# Patient Record
Sex: FEMALE | Race: WHITE | NOT HISPANIC OR LATINO | Employment: UNEMPLOYED | URBAN - METROPOLITAN AREA
[De-identification: names, ages, dates, MRNs, and addresses within clinical notes are randomized per-mention and may not be internally consistent; named-entity substitution may affect disease eponyms.]

---

## 2018-05-02 ENCOUNTER — HOSPITAL ENCOUNTER (EMERGENCY)
Facility: HOSPITAL | Age: 43
Discharge: HOME | End: 2018-05-02
Attending: EMERGENCY MEDICINE
Payer: COMMERCIAL

## 2018-05-02 VITALS
OXYGEN SATURATION: 98 % | HEART RATE: 95 BPM | TEMPERATURE: 99 F | SYSTOLIC BLOOD PRESSURE: 190 MMHG | RESPIRATION RATE: 16 BRPM | WEIGHT: 173 LBS | DIASTOLIC BLOOD PRESSURE: 103 MMHG

## 2018-05-02 DIAGNOSIS — M25.561 ACUTE PAIN OF RIGHT KNEE: Primary | ICD-10-CM

## 2018-05-02 PROCEDURE — 99283 EMERGENCY DEPT VISIT LOW MDM: CPT

## 2018-05-02 PROCEDURE — 99283 EMERGENCY DEPT VISIT LOW MDM: CPT | Mod: ,,, | Performed by: PHYSICIAN ASSISTANT

## 2018-05-02 NOTE — ED PROVIDER NOTES
Encounter Date: 5/2/2018       History     Chief Complaint   Patient presents with    Knee Pain     jumped off back of a truck and c/o right knee pain and swelling      Patient is a 42 year old female with no significant PMHx. She presents to the ED for knee pain. She reports having right knee pain onset today at noon. Reports jumping out the back of the truck and right knee buckling down to ground. Describes right knee pain as constant, throbbing, and tightness. Reports associated swelling. Rates pain 8/10. Reports pain with weight bearing. Reports pain relief with Excedrin taken at 12:45 PM. She denies fever,chills, nausea, vomiting, sob, chest pain, abd pain, dysuria, diarrhea, or constipation. She is a non smoker and denies alcohol use.             Review of patient's allergies indicates:  Allergies not on file  History reviewed. No pertinent past medical history.  Past Surgical History:   Procedure Laterality Date    CHOLECYSTECTOMY      TONSILLECTOMY       History reviewed. No pertinent family history.  Social History   Substance Use Topics    Smoking status: Never Smoker    Smokeless tobacco: Never Used    Alcohol use No     Review of Systems   Constitutional: Negative for fever.   HENT: Negative for sore throat.    Respiratory: Negative for shortness of breath.    Cardiovascular: Negative for chest pain.   Gastrointestinal: Negative for abdominal pain, nausea and vomiting.   Genitourinary: Negative for dysuria.   Musculoskeletal: Positive for arthralgias (right knee pain). Negative for back pain.   Skin: Negative for rash.   Neurological: Negative for weakness.   Hematological: Does not bruise/bleed easily.       Physical Exam     Initial Vitals [05/02/18 1344]   BP Pulse Resp Temp SpO2   (!) 190/103 95 16 98.5 °F (36.9 °C) 98 %      MAP       132         Physical Exam    Vitals reviewed.  Constitutional: She appears well-developed and well-nourished. She is not diaphoretic. No distress.   Patient  resting comfortably in NAD on RA.    HENT:   Head: Normocephalic and atraumatic.   Nose: Nose normal.   Eyes: Conjunctivae and EOM are normal. Pupils are equal, round, and reactive to light.   Neck: Normal range of motion.   Cardiovascular: Normal rate and regular rhythm. Exam reveals no friction rub.    No murmur heard.  Pulmonary/Chest: Breath sounds normal. No respiratory distress. She has no wheezes. She has no rales.   Abdominal: Soft. Bowel sounds are normal. She exhibits no distension. There is no tenderness. There is no rebound.   Musculoskeletal:        Right knee: She exhibits decreased range of motion (pain with extension) and swelling. She exhibits no deformity and no erythema.   Neurological: She is alert and oriented to person, place, and time. She has normal strength. No sensory deficit.   Skin: Skin is warm and dry. No erythema.   Psychiatric: She has a normal mood and affect. Thought content normal.         ED Course   Procedures  Labs Reviewed - No data to display     Imaging Results          X-Ray Knee 3 View Right (Final result)  Result time 05/02/18 16:11:22    Final result by James Healy MD (05/02/18 16:11:22)                 Impression:      There is no evidence of fracture or subluxation.      Electronically signed by: James Healy MD  Date:    05/02/2018  Time:    16:11             Narrative:    EXAMINATION:  XR KNEE 3 VIEW RIGHT    CLINICAL HISTORY:  Pain in right knee    TECHNIQUE:  AP, lateral, and Merchant views of the right knee were performed.    COMPARISON:  None    FINDINGS:  The alignment is within normal limits.There is no evidence for displaced fracture.No evidence of lytic or blastic lesions.Soft tissues are unremarkable.Joint spaces are unremarkable.                                         APC / Resident Notes:   Patient is a 42 year old female with no significant PMHx. She presents to the ED for knee pain.    Will order imaging. Pain medication offered to patient,  patient declines at this time reports pain is tolerable. Will continue to monitor.     Differential diagnoses include, but are not limited to: contusion, knee effusion, fracture, dislocation, gout, and osteoarthritis.     Imaging found to have no evidence of fracture or subluxation.    Patient reassessed, reports symptoms improved with ED management. I have discussed emergency department findings, and plan with the patient. Will discharge home with F/U with ortho and PCP. Patient verbalizes understanding of plan and agrees. Return precautions given.     I have discussed and reviewed with my supervising physician.             Attending Attestation:     Physician Attestation Statement for NP/PA:   I discussed this assessment and plan of this patient with the NP/PA, but I did not personally examine the patient. The face to face encounter was performed by the NP/PA.            Clinical Impression:   The encounter diagnosis was Acute pain of right knee.    Disposition:   Disposition: Discharged  Condition: Stable                        Bea Guzman PA-C  05/02/18 1812       Jose L Cortez MD  05/07/18 7304

## 2018-05-02 NOTE — ED NOTES
LOC: The patient is awake, alert, and oriented to place, time, situation. Affect is appropriate.  Speech is appropriate and clear.     APPEARANCE: Patient resting comfortably in no acute distress.  Patient is clean and well groomed.    SKIN: The skin is warm and dry; color consistent with ethnicity.  Patient has normal skin turgor and moist mucus membranes.  Skin intact; no breakdown or bruising noted.     MUSCULOSKELETAL: Patient moving upper extremities without difficulty.  C/o pain right knee.  Swelling noted. Denies weakness.     RESPIRATORY: Airway is open and patent. Respirations spontaneous, even, easy, and non-labored.  Patient has a normal effort and rate.  No accessory muscle use noted. Denies cough.     CARDIAC:   No peripheral edema noted. No complaints of chest pain.      ABDOMEN: Soft and non tender to palpation.  No distention noted.     NEUROLOGIC: Eyes open spontaneously.  Behavior appropriate to situation.  Follows commands; facial expression symmetrical.  Purposeful motor response noted; normal sensation in all extremities.

## 2018-05-02 NOTE — ED TRIAGE NOTES
Come to the ED with c/o right knee pain after jumping out of the bed of a  truck. Pt felt pain behind the knee.

## 2018-05-07 ENCOUNTER — OFFICE VISIT (OUTPATIENT)
Dept: ORTHOPEDICS | Facility: CLINIC | Age: 43
End: 2018-05-07
Payer: COMMERCIAL

## 2018-05-07 VITALS
WEIGHT: 171.19 LBS | HEIGHT: 63 IN | BODY MASS INDEX: 30.33 KG/M2 | DIASTOLIC BLOOD PRESSURE: 80 MMHG | SYSTOLIC BLOOD PRESSURE: 130 MMHG | HEART RATE: 92 BPM

## 2018-05-07 DIAGNOSIS — M25.561 ACUTE PAIN OF RIGHT KNEE: Primary | ICD-10-CM

## 2018-05-07 DIAGNOSIS — M25.361 INSTABILITY OF RIGHT KNEE JOINT: ICD-10-CM

## 2018-05-07 PROCEDURE — 3008F BODY MASS INDEX DOCD: CPT | Mod: CPTII,S$GLB,, | Performed by: PHYSICIAN ASSISTANT

## 2018-05-07 PROCEDURE — 99203 OFFICE O/P NEW LOW 30 MIN: CPT | Mod: S$GLB,,, | Performed by: PHYSICIAN ASSISTANT

## 2018-05-07 PROCEDURE — 99999 PR PBB SHADOW E&M-EST. PATIENT-LVL III: CPT | Mod: PBBFAC,,, | Performed by: PHYSICIAN ASSISTANT

## 2018-05-07 RX ORDER — HYDROCODONE BITARTRATE AND ACETAMINOPHEN 5; 325 MG/1; MG/1
1-2 TABLET ORAL EVERY 6 HOURS PRN
Qty: 30 TABLET | Refills: 0 | Status: SHIPPED | OUTPATIENT
Start: 2018-05-07

## 2018-05-07 NOTE — PROGRESS NOTES
SUBJECTIVE:     Chief Complaint & History of Present Illness:  Samantha Aranda is a New patient 42 y.o. female who is seen here today with a complaint of    Chief Complaint   Patient presents with    Right Knee - Pain, Swelling, Injury    .  Should today for evaluation treatment of sudden onset pain and swelling in the right knee following a fall while jumping from the back of a vehicle.  She's had collapse of the right knee falling to ground developed immediate pain and inability to bear weight.  She was seen in urgent care 5/02/2018 x-rays at that time demonstrated no evidence of fracture dislocation.  She has a been nonweightbearing crutch walking since the time of the injury but continues to struggle with persistent swelling and effusion in the knee and ability to flex greater than 25° and weight-bear P James reports the pain is predominant in the posterior aspect of the knee  On a scale of 1-10, with 10 being worst pain imaginable, he rates this pain as 7 on good days and 10 on bad days.  she describes the pain as tender and sore.    Review of patient's allergies indicates:  No Known Allergies      Current Outpatient Prescriptions   Medication Sig Dispense Refill    aspirin-acetaminophen-caffeine 250-250-65 mg (EXCEDRIN MIGRAINE) 250-250-65 mg per tablet Take 1 tablet by mouth every 6 (six) hours as needed for Pain.      hydrocodone-acetaminophen 5-325mg (NORCO) 5-325 mg per tablet Take 1-2 tablets by mouth every 6 (six) hours as needed for Pain. 30 tablet 0     No current facility-administered medications for this visit.        History reviewed. No pertinent past medical history.    Past Surgical History:   Procedure Laterality Date    CHOLECYSTECTOMY      TONSILLECTOMY         Vital Signs (Most Recent)  Vitals:    05/07/18 1604   BP: 130/80   Pulse: 92           Review of Systems:  ROS:  Constitutional: no fever or chills  Eyes: no visual changes  ENT: no nasal congestion or sore throat  Respiratory:  "no cough or shortness of breath  Cardiovascular: no chest pain or palpitations  Gastrointestinal: no nausea or vomiting, tolerating diet  Genitourinary: no hematuria or dysuria  Integument/Breast: no rash or pruritis  Hematologic/Lymphatic: no easy bruising or lymphadenopathy  Musculoskeletal: no arthralgias or myalgias  Neurological: no seizures or tremors  Behavioral/Psych: no auditory or visual hallucinations  Endocrine: no heat or cold intolerance                OBJECTIVE:     PHYSICAL EXAM:  Height: 5' 3" (160 cm) Weight: 77.7 kg (171 lb 3 oz), General Appearance: Well nourished, well developed, in no acute distress.  Neurological: Mood & affect are normal.  right  Knee Exam:  Knee Range of Motion:5-30 degrees flexion   Effusion:yes  Condition of skin:intact  Location of tenderness: Globally and popliteal fossa   Strength:limited by pain  Stability:  Lachman: unstable, LCL: stable, MCL: stable, PCL: grade I and posteromedial (dial): unstable  Varus /Valgus stress:  normal  Nell:   Unequivocal    left  Knee Exam:  Knee Range of Motion:0-125 degrees flexion   Effusion:none  Condition of skin:intact  Location of tenderness:None   Strength:5 of 5  Stability:  stable to testing  Varus /Valgus stress:  normal      Hip Examination:  normal    RADIOGRAPHS:  X-rays from previous visit reviewed and me today demonstrate well preserved joint spaces throughout the knee no evidence of fracture dislocation or other bony abnormality    ASSESSMENT/PLAN:     Plan: We discussed with the patient at length all the different treatment options available for  the knee including anti-inflammatories, acetaminophen, rest, ice, knee strengthening exercise, occasional cortisone injections for temporary relief, Viscosupplimentation injections, arthroscopic debridement osteotomy, and finally knee arthroplasty.   Patient remain nonweightbearing crutch walking  Hinged knee brace  Rest ice elevation  Norco 5/325 every 4-6 hours when " necessary for pain control  MRI of the right knee  Follow-up after MRI to discuss treatment plans

## 2018-05-07 NOTE — LETTER
May 7, 2018      Jose L Cortez MD  [address] [facility] [place] [address]           [facility]  [address] [address]  Phone: [phone]  Fax: [phone]          Patient: Samantha Aranda   MR Number: [id number]   YOB: 1975   Date of Visit: 5/7/2018       Dear Dr. Jose L Cortez:    Thank you for referring Samantha Aranda to me for evaluation. Attached you will find relevant portions of my assessment and plan of care.    If you have questions, please do not hesitate to call me. I look forward to following Samantha Aranda along with you.    Sincerely,    Levi Miller PA-C    Enclosure  CC:  No Recipients    If you would like to receive this communication electronically, please contact [email or web address] or [phone] to request more information on [employer] Link access.    For providers and/or their staff who would like to refer a patient to [facility], please contact us through our one-stop-shop provider referral line, [facility] , at [phone].    If you feel you have received this communication in error or would no longer like to receive these types of communications, please e-mail [email or web address]

## 2018-05-08 ENCOUNTER — HOSPITAL ENCOUNTER (OUTPATIENT)
Dept: RADIOLOGY | Facility: HOSPITAL | Age: 43
Discharge: HOME | End: 2018-05-08
Attending: PHYSICIAN ASSISTANT
Payer: COMMERCIAL

## 2018-05-08 DIAGNOSIS — M25.361 INSTABILITY OF RIGHT KNEE JOINT: ICD-10-CM

## 2018-05-08 DIAGNOSIS — M25.561 ACUTE PAIN OF RIGHT KNEE: ICD-10-CM

## 2018-05-08 PROCEDURE — 73721 MRI JNT OF LWR EXTRE W/O DYE: CPT | Mod: TC,RT

## 2018-05-08 PROCEDURE — 73721 MRI JNT OF LWR EXTRE W/O DYE: CPT | Mod: 26,RT,, | Performed by: RADIOLOGY

## 2018-05-09 ENCOUNTER — TELEPHONE (OUTPATIENT)
Dept: ORTHOPEDICS | Facility: CLINIC | Age: 43
End: 2018-05-09

## 2018-05-09 NOTE — TELEPHONE ENCOUNTER
----- Message from Vivian Salazar sent at 5/9/2018  1:35 PM CDT -----  Contact: Self/ [phone]  Patient would like a call back to see why her MRI results are not uploading on the my [facility].

## 2018-05-18 ENCOUNTER — TELEPHONE (OUTPATIENT)
Dept: ORTHOPEDICS | Facility: CLINIC | Age: 43
End: 2018-05-18

## 2018-05-18 NOTE — TELEPHONE ENCOUNTER
Notified pt. Per JULIO Miller PA-C clinic notes Patient remain nonweightbearing crutch walking  Hinged knee brace  Rest ice elevation  Patient states verbal understanding and has no further questions.

## 2018-05-18 NOTE — TELEPHONE ENCOUNTER
----- Message from Kamryn Garcia sent at 5/18/2018 12:08 PM CDT -----  Contact: Self  Pt requesting a call back from TAVO Miller or Jennifer regarding her leg. Need to know if she should walk around on her leg brace or with the crutches or just stay off the knee completely. Pt could be reached at [phone]

## 2018-05-21 NOTE — PROGRESS NOTES
CC: Right knee pain    42 y.o. Female who presents as a new patient to me. 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 Accompanied by her  today. Complaint is right knee pain and instability since 5/2/18. Patient reports that she was jumping off the a truck bed & her R knee buckled. No pop. Subsequent pain, swelling and difficulty WB. Was seen by a mid-level provider, MRI was ordered & she was referred to me. Patient has been using crutches intermittently since injury and reports today in brace. No prominent mechanical symptoms. Instability complaints daily. Worse with knee motion. Better with rest, NWB. No prior knee problems. Treatment thus far has included rest, activity modifications, oral medications. Here today to discuss diagnosis and treatment options.      Negative for tobacco.   Negative for diabetes.     Pain Score: 0-No pain    This patient is seen in consultation requested by Levi Miller PA-C. We will communicate findings back to the requesting provider via electronic letter.     REVIEW OF SYSTEMS:   Constitution: Negative. Negative for chills, fever and night sweats.    Hematologic/Lymphatic: Negative for bleeding problem. Does not bruise/bleed easily.   Skin: Negative for dry skin, itching and rash.   Musculoskeletal: Negative for falls. Positive for right knee pain and  muscle weakness.     PAST MEDICAL HISTORY:   History reviewed. No pertinent past medical history.    PAST SURGICAL HISTORY:   Past Surgical History:   Procedure Laterality Date    CHOLECYSTECTOMY      TONSILLECTOMY         FAMILY HISTORY:   History reviewed. No pertinent family history.    SOCIAL HISTORY:   Social History     Social History    Marital status:      Spouse name: N/A    Number of children: N/A    Years of education: N/A     Occupational History    Not on file.     Social History Main Topics    Smoking status: Never Smoker    Smokeless tobacco: Never Used     "Alcohol use No    Drug use: No    Sexual activity: Not on file     Other Topics Concern    Not on file     Social History Narrative    No narrative on file       MEDICATIONS:     Current Outpatient Prescriptions:     aspirin-acetaminophen-caffeine 250-250-65 mg (EXCEDRIN MIGRAINE) 250-250-65 mg per tablet, Take 1 tablet by mouth every 6 (six) hours as needed for Pain., Disp: , Rfl:     hydrocodone-acetaminophen 5-325mg (NORCO) 5-325 mg per tablet, Take 1-2 tablets by mouth every 6 (six) hours as needed for Pain., Disp: 30 tablet, Rfl: 0    ALLERGIES:   Review of patient's allergies indicates:  No Known Allergies     PHYSICAL EXAMINATION:  BP (!) 150/101   Pulse 96   Ht 5' 3" (1.6 m)   Wt 77.6 kg (171 lb)   LMP 04/25/2018   BMI 30.29 kg/m²   General: Well-developed well-nourished 42 y.o. femalein no acute distress   Cardiovascular: Regular rhythm by palpation of distal pulse, normal color and temperature, no concerning varicosities on symptomatic side   Lungs: No labored breathing or wheezing appreciated   Neuro: Alert and oriented ×3   Psychiatric: well oriented to person, place and time, demonstrates normal mood and affect   Skin: No rashes, lesions or ulcers, normal temperature, turgor, and texture on involved extremity    Ortho/SPM Exam   Examination of the right knee shows standing physiologic varus alignment.  1+ effusion.  Intact extensor mechanism.  Negative patellar apprehension.  1 quadrant medial and lateral patellar mobility.  Central tracking.  10 degrees short of full extension with guarding.  Passive flexion to 80 degrees. Grade 2B Lachman.  Negative posterior drawer. Negative Chelsy. Stable to varus and valgus stress at 0 and 30°.  Negative prone dial at 30°.  Guards with pivot shift testing.  Mild medial and lateral joint line tenderness.  Nontender specifically over the posterior lateral corner.  Generalized discomfort with Nell's testing.    IMAGING:  X-rays including standing, " weight bearing AP and flexion bilateral knees, RIGHT knee lateral and sunrise views ordered and images reviewed by me show:    No degenerative changes seen    Standing Hip to Ankle views were ordered and reviewed by me. Images show:   Weight-bearing axis crosses over the medial tibial spine    MRI reviewed by me and discussed with patient. Study shows:    1. Complete ACL tear.   2. Grade I-II sprain of the oblique popliteal ligament/posterior capsule.   3. Grade I sprain of the posterior oblique ligament/posteromedial capsule.   4. Possible grade I sprain of the arcuate and fabelo-fibular ligaments.   5. Linear hyperintensity along the meniscocapsular junction of the posterior horn of the medial meniscus which may relate to contusion or partial separation.  No displaced meniscal tears.   6. Nondisplaced fractures of the posterior aspect of the medial and lateral tibial plateaus.   7. Large joint effusion with associated fat fluid level in keeping with lipohemarthrosis.      ASSESSMENT:      ICD-10-CM ICD-9-CM   1. Rupture of anterior cruciate ligament of right knee, initial encounter S83.511A 844.2   2. Right knee pain, unspecified chronicity M25.561 719.46       PLAN:     I had a longer discussion with the patient and her  today regarding my findings and treatment options.  Both operative and nonoperative options were reviewed, to include benefits, risks and potential complications with both options.  The patient prefers to continue recreational sports in the future potentially in also has some anxiety regarding any recurrent instability type complaints.  She has done a good bit of research on her own and seems to prefer surgery.  Certainly this is reasonable.  The details of surgery to include the expected postop rehabilitation and recovery course was outlined.  In particular, I also discussed the various graft options.  At this time she prefers a bone patellar tendon bone autograft.  She does have some  swelling and loss of motion present.  Plan is to refer for physical therapy to regain motion and to allow time for swelling to resolve.  Fitted for a long runner brace today.  Ice and oral anti-inflammatory medication as needed.  I'll see her back in 3 weeks at which time we'll discuss surgery further and possibly schedule.  All questions answered today.      Procedures

## 2018-05-22 ENCOUNTER — HOSPITAL ENCOUNTER (OUTPATIENT)
Dept: RADIOLOGY | Facility: HOSPITAL | Age: 43
Discharge: HOME | End: 2018-05-22
Attending: ORTHOPAEDIC SURGERY
Payer: COMMERCIAL

## 2018-05-22 ENCOUNTER — OFFICE VISIT (OUTPATIENT)
Dept: SPORTS MEDICINE | Facility: CLINIC | Age: 43
End: 2018-05-22
Payer: COMMERCIAL

## 2018-05-22 VITALS
HEIGHT: 63 IN | WEIGHT: 171 LBS | DIASTOLIC BLOOD PRESSURE: 101 MMHG | HEART RATE: 96 BPM | SYSTOLIC BLOOD PRESSURE: 150 MMHG | BODY MASS INDEX: 30.3 KG/M2

## 2018-05-22 DIAGNOSIS — M25.561 RIGHT KNEE PAIN, UNSPECIFIED CHRONICITY: ICD-10-CM

## 2018-05-22 DIAGNOSIS — S83.511A RUPTURE OF ANTERIOR CRUCIATE LIGAMENT OF RIGHT KNEE, INITIAL ENCOUNTER: Primary | ICD-10-CM

## 2018-05-22 DIAGNOSIS — S83.511A RUPTURE OF ANTERIOR CRUCIATE LIGAMENT OF RIGHT KNEE, INITIAL ENCOUNTER: ICD-10-CM

## 2018-05-22 PROCEDURE — 77073 BONE LENGTH STUDIES: CPT | Mod: 26,,, | Performed by: RADIOLOGY

## 2018-05-22 PROCEDURE — 77073 BONE LENGTH STUDIES: CPT | Mod: TC,FY,PO

## 2018-05-22 PROCEDURE — 73562 X-RAY EXAM OF KNEE 3: CPT | Mod: 26,XS,LT, | Performed by: RADIOLOGY

## 2018-05-22 PROCEDURE — 3008F BODY MASS INDEX DOCD: CPT | Mod: CPTII,S$GLB,, | Performed by: ORTHOPAEDIC SURGERY

## 2018-05-22 PROCEDURE — 99204 OFFICE O/P NEW MOD 45 MIN: CPT | Mod: S$GLB,,, | Performed by: ORTHOPAEDIC SURGERY

## 2018-05-22 PROCEDURE — 99999 PR PBB SHADOW E&M-EST. PATIENT-LVL III: CPT | Mod: PBBFAC,,, | Performed by: ORTHOPAEDIC SURGERY

## 2018-05-22 PROCEDURE — 73564 X-RAY EXAM KNEE 4 OR MORE: CPT | Mod: 26,XS,RT, | Performed by: RADIOLOGY

## 2018-05-22 PROCEDURE — 73562 X-RAY EXAM OF KNEE 3: CPT | Mod: TC,FY,PO,LT

## 2018-05-22 NOTE — LETTER
May 22, 2018      Levi Miller PA-C  [address]  [facility] [address]           [facility]  [address]  [facility] [address]  Phone: [phone]          Patient: Samantha Aranda   MR Number: [id number]   YOB: 1975   Date of Visit: 5/22/2018       Dear Levi Miller:    Thank you for referring Samantha Aranda to me for evaluation. Attached you will find relevant portions of my assessment and plan of care.    If you have questions, please do not hesitate to call me. I look forward to following Samantha Aranda along with you.    Sincerely,    JACQUELINE Layton MD    Enclosure  CC:  No Recipients    If you would like to receive this communication electronically, please contact [email or web address] or [phone] to request more information on [employer] Link access.    For providers and/or their staff who would like to refer a patient to [facility], please contact us through our one-stop-shop provider referral line, [facility], at [phone].    If you feel you have received this communication in error or would no longer like to receive these types of communications, please e-mail [email or web address]

## 2018-06-01 ENCOUNTER — CLINICAL SUPPORT (OUTPATIENT)
Dept: REHABILITATION | Facility: HOSPITAL | Age: 43
End: 2018-06-01
Attending: ORTHOPAEDIC SURGERY
Payer: COMMERCIAL

## 2018-06-01 DIAGNOSIS — M25.561 ACUTE PAIN OF RIGHT KNEE: ICD-10-CM

## 2018-06-01 DIAGNOSIS — R26.2 DIFFICULTY WALKING: ICD-10-CM

## 2018-06-01 PROCEDURE — 97161 PT EVAL LOW COMPLEX 20 MIN: CPT

## 2018-06-01 PROCEDURE — 97110 THERAPEUTIC EXERCISES: CPT

## 2018-06-01 NOTE — PROGRESS NOTES
Physical Therapy Evaluation    Name: Samantha Aranda  Clinic Number: [id number]      Diagnosis:   Encounter Diagnoses   Name Primary?    Acute pain of right knee     Difficulty walking      Physician: JACQUELINE Layton MD  Treatment Orders: PT Eval and Treat    No past medical history on file.  Current Outpatient Prescriptions   Medication Sig    aspirin-acetaminophen-caffeine 250-250-65 mg (EXCEDRIN MIGRAINE) 250-250-65 mg per tablet Take 1 tablet by mouth every 6 (six) hours as needed for Pain.    hydrocodone-acetaminophen 5-325mg (NORCO) 5-325 mg per tablet Take 1-2 tablets by mouth every 6 (six) hours as needed for Pain.     No current facility-administered medications for this visit.      Review of patient's allergies indicates:  No Known Allergies  Precautions: MRI confirmed complete ACL tear; undecided on surgery    Evaluation Date: 6/4/2018  Visit # authorized: 1/12  Authorization period: 12/31/2018    Subjective     Onset/IGOR: sudden    Primary concern/ Chief complaints:    Samantha is a 42 y.o. female that presents to [facility] secondary to right knee pain and weakness with MRI confirmed ACL tear.  Pt states she jumped out of the back of a truck 5/2/2018 and felt a pop and was immediately unable to bear weight.  She is undecided if she should have surgery or not.  X-ray and MRI was taken and revealed a complete ACL tear. Pt uses ibuprofen and pain medication as needed to control symptoms. Pt has a decreased ability to perform ADLs, functional mobility and ambulation as well as really likes to lift weights and is currently unable to ambulate, perform ADLs, functional mobility or exercise as desired. 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 Pt denies numbness and tingling. No cultural, environmental, or spiritual barriers identified to treatment or learning.    Pain Scale: Samantha rates pain on a scale of 0-10 to be 6 at worst; 4 currently; 3 at best .    Patient Goals: Return to full  functional mobility and ability to exercise as desired without pain or difficulty    Objective     Observation: Patient is ambulating with significant knee flexion on R LE    Posture: Patient stands with bilateral knee valgus, increased Q-angle, femoral internal rotation and forward trunk lean.    Functional tests:    SL Squat: Unable to perform   DL Squat: Unable to perform   Step-down: Unable to perform   SLS EO: Unable to perform    Range of Motion:   Knee Right active Left Active   Flexion 95 141   Extension -19 0     Lower Extremity Strength:  Right LE  Left LE    Quadriceps: trace Quadriceps: 4+/5   Hamstrings: 3/5 Hamstrings: 4/5   Hip flexion (supine): 4/5 Hip flexion (supine): 4/5   Hip extension:  4/5 Hip extension: 4/5   Posterior Glute Med: 3/5 PGM:  3/5     Special Tests:  None performed.    Joint Mobility: Grossly hypomobile R knee.     Palpation:  Point tenderness noted surrounding patella.    Sensation: Intact.    Flexibility:    Moderately restricted gastroc, quad and hamstrings R LE.    Edema: Mild joint effusion.    Functional Limitations Reports  Category: Mobility  Tool: FOTO Knee 47%    PT Evaluation Completed? Yes  Discussed Plan of Care with patient: Yes    TREATMENT:  Samantha received therapeutic exercises to develop strength and endurance, flexibility for 45 minutes including:   Extensive education regarding POC, recommendations for surgery vs. Conservative management  Seated Heel Prop w/ strap 5'  Patellar mobs as tolerated  QS 10sh x 20  SLR to fatigue  Heel Slides w/ Flx Stretching  Seated EOT Knee Flx     Pt. Received cold pack x 10 min. To right knee following treatment.    Instructed pt. regarding: Proper technique with all exercises. Pt demonstrated good understanding of the education provided. Samantha demonstrated good return demonstration of activities.    Assessment     This is a 42 y.o. female referred to outpatient physical therapy and presents with a medical diagnosis of right  ACL tear.  Patient presents with signs and symptoms consistent with diagnosis with significant ROM and quad deficits.  Patient's chief complaint is inability to use  R LE.  Patient will benefit from skilled physical therapy services, specifically normalized ROM, gross lower extremity strengthening, functional mobility, balance/proprioception, gait training and higher-level activities as tolerated. The following goals were discussed with the patient and patient is in agreement with them as to be addressed in the treatment plan. Patient was given a HEP consisting of exercises listed above. Patient verbally understood the instructions as they were given and demonstrated proper form and technique during therapy. Pt was advised to perform these exercises free of pain, and to stop performing them if pain occurs.     History  Co-morbidities and personal factors that may impact the plan of care Low    Stable Clinical Presentation   Co-morbidities:       Personal Factors:     Body regions    Body systems    Activity Limitations    Participation Restrictons   No personal factors and/ or  comorbidites          Address 1-2 elements:     Decreased ROM  Decreased ambulation       Medical necessity is demonstrated by the following IMPAIRMENTS/PROBLEM LIST:   1)Increase in pain level limiting function   2)Decreased ROM   3)Decreased strength   4)Decreased ambulation   5)Lack of HEP    GOALS: Short Term Goals:  8 weeks  1.  Report decreased right knee pain  < / =  2/10  to increase tolerance for ADLs and community ambulation.  2.  Increase knee ROM to equal opposite knee in order to be able to perform ADLs without difficulty.  3.  Increase gross lower extremity strength to 4/5 to increase tolerance for ADL and community activities.  4.  Pt to tolerate HEP to improve ROM and independence with ADL's.  5.  Patient will ambulate with normalized gait pattern without the use of AD.    Long Term Goals: 12-16 weeks  1.  Report decreased  right knee pain < / = 1/10  to increase tolerance for full return to workout activities as desired.  2.  Patient goal: Return to full ability to workout as desired without pain or difficulty.  3.  Increase strength to >/= 5/5 in bilateral LEs to increase tolerance for ADL and workout activities.  4.  Demonstrate single limb squat with proper biomechanics in order to safely return to daily and recreational activities.  5.  Pt will report at CJ level (20-40% impaired) on FOTO  to demonstrate increase in LE function with every day tasks.     Plan     Pt will be treated by physical therapy 1-2 times a week for Pt Education, HEP, therapeutic exercises, neuromuscular re-education, manual therapy, joint mobilizations, modalities prn to achieve established goals. Samantha may at times be seen by a PTA as part of the Rehab Team.     Cont PT for 12-16 weeks.     Karishma Lopez, PT, DPT, SCS        I certify the need for these services furnished under this plan of treatment and while under my care.______________________________ Physician/Referring Practitioner  Date of Signature

## 2018-06-04 ENCOUNTER — CLINICAL SUPPORT (OUTPATIENT)
Dept: REHABILITATION | Facility: HOSPITAL | Age: 43
End: 2018-06-04
Attending: ORTHOPAEDIC SURGERY
Payer: COMMERCIAL

## 2018-06-04 DIAGNOSIS — R26.2 DIFFICULTY WALKING: ICD-10-CM

## 2018-06-04 DIAGNOSIS — M25.561 ACUTE PAIN OF RIGHT KNEE: ICD-10-CM

## 2018-06-04 PROCEDURE — 97110 THERAPEUTIC EXERCISES: CPT

## 2018-06-04 NOTE — PROGRESS NOTES
Physical Therapy Visit Note    Name: Samantha Aranda  Clinic Number: [id number]      Diagnosis:   Encounter Diagnoses   Name Primary?    Acute pain of right knee     Difficulty walking      Physician: JACQUELINE Layton MD  Treatment Orders: PT Eval and Treat    No past medical history on file.  Current Outpatient Prescriptions   Medication Sig    aspirin-acetaminophen-caffeine 250-250-65 mg (EXCEDRIN MIGRAINE) 250-250-65 mg per tablet Take 1 tablet by mouth every 6 (six) hours as needed for Pain.    hydrocodone-acetaminophen 5-325mg (NORCO) 5-325 mg per tablet Take 1-2 tablets by mouth every 6 (six) hours as needed for Pain.     No current facility-administered medications for this visit.      Review of patient's allergies indicates:  No Known Allergies  Precautions: MRI confirmed complete ACL tear; undecided on surgery    Today's Date:  6/4/2018  Evaluation Date: 6/4/2018  Visit # authorized: 2/12  Authorization period: 12/31/2018  Time In:  1310  Time Out:  1405    Subjective     Patient states she has been doing her exercises and it just seems like it is going very slow.  She wants to feel muscle burn to feel like it's working.    Patient Goals: Return to full functional mobility and ability to exercise as desired without pain or difficulty    Objective     Observation: Patient is ambulating with significant knee flexion on R LE bilateral crutches as recommended by PT.  (too short, adjusted by PT for correct fit)    Posture: Patient stands with bilateral knee valgus, increased Q-angle, femoral internal rotation and forward trunk lean.    Functional tests:    SL Squat: Unable to perform   DL Squat: Unable to perform   Step-down: Unable to perform   SLS EO: Unable to perform    Range of Motion:   Knee Right active Left Active   Flexion 95 141   Extension -15 pre-tx; -3 post-tx 0     Lower Extremity Strength:  Right LE  Left LE    Quadriceps: trace Quadriceps: 4+/5   Hamstrings: 3/5 Hamstrings: 4/5   Hip flexion  (supine): 4/5 Hip flexion (supine): 4/5   Hip extension:  4/5 Hip extension: 4/5   Posterior Glute Med: 3/5 PGM:  3/5     Special Tests:  None performed.    Joint Mobility: Grossly hypomobile R knee.     Palpation:  Point tenderness noted surrounding patella.    Sensation: Intact.    Flexibility:    Moderately restricted gastroc, quad and hamstrings R LE.    Edema: Mild joint effusion.    Functional Limitations Reports  Category: Mobility  Tool: FOTO Knee 47%    TREATMENT:  Samantha received therapeutic exercises to develop strength and endurance, flexibility for 55 minutes including:   Bike Rocking for flexion stretching  Seated Heel Prop w/ strap 5' and 10#  Patellar mobs as tolerated  QS 10sh x 20  SLR 20x  SB Bridge 5sh x 20  SB HS Curls w/ Flx OP stretching 30x  Bridging 5sh x 20  TKE Ball/Wall 10sh x 30  Standing Calf Raises emph TKE 10x3  Ambulation practice w/ properly adjusted crutches emph TKE     Pt. Received cold pack x 10 min. To right knee following treatment. (not today 2/2 patient late)    Instructed pt. regarding: Proper technique with all exercises. Pt demonstrated good understanding of the education provided. Samantha demonstrated good return demonstration of activities.    Assessment     Today's Assessment:  Patient fatigued with today's exercises but felt much better after and could really tell the muscles worked.  Add more glutes next visit.    This is a 42 y.o. female referred to outpatient physical therapy and presents with a medical diagnosis of right ACL tear.  Patient presents with signs and symptoms consistent with diagnosis with significant ROM and quad deficits.  Patient's chief complaint is inability to use  R LE.  Patient will benefit from skilled physical therapy services, specifically normalized ROM, gross lower extremity strengthening, functional mobility, balance/proprioception, gait training and higher-level activities as tolerated.     Medical necessity is demonstrated by the  following IMPAIRMENTS/PROBLEM LIST:   1)Increase in pain level limiting function   2)Decreased ROM   3)Decreased strength   4)Decreased ambulation   5)Lack of HEP    GOALS: Short Term Goals:  8 weeks  1.  Report decreased right knee pain  < / =  2/10  to increase tolerance for ADLs and community ambulation.  2.  Increase knee ROM to equal opposite knee in order to be able to perform ADLs without difficulty.  3.  Increase gross lower extremity strength to 4/5 to increase tolerance for ADL and community activities.  4.  Pt to tolerate HEP to improve ROM and independence with ADL's.  5.  Patient will ambulate with normalized gait pattern without the use of AD.    Long Term Goals: 12-16 weeks  1.  Report decreased right knee pain < / = 1/10  to increase tolerance for full return to workout activities as desired.  2.  Patient goal: Return to full ability to workout as desired without pain or difficulty.  3.  Increase strength to >/= 5/5 in bilateral LEs to increase tolerance for ADL and workout activities.  4.  Demonstrate single limb squat with proper biomechanics in order to safely return to daily and recreational activities.  5.  Pt will report at CJ level (20-40% impaired) on FOTO  to demonstrate increase in LE function with every day tasks.     Plan     Pt will be treated by physical therapy 1-2 times a week for Pt Education, HEP, therapeutic exercises, neuromuscular re-education, manual therapy, joint mobilizations, modalities prn to achieve established goals. Samantha may at times be seen by a PTA as part of the Rehab Team.     Cont PT for 12-16 weeks.     Karishma Lopez, PT, DPT, SCS        I certify the need for these services furnished under this plan of treatment and while under my care.______________________________ Physician/Referring Practitioner  Date of Signature

## 2018-06-07 ENCOUNTER — CLINICAL SUPPORT (OUTPATIENT)
Dept: REHABILITATION | Facility: HOSPITAL | Age: 43
End: 2018-06-07
Attending: ORTHOPAEDIC SURGERY
Payer: COMMERCIAL

## 2018-06-07 DIAGNOSIS — R26.2 DIFFICULTY WALKING: ICD-10-CM

## 2018-06-07 DIAGNOSIS — M25.561 ACUTE PAIN OF RIGHT KNEE: ICD-10-CM

## 2018-06-07 PROCEDURE — 97110 THERAPEUTIC EXERCISES: CPT

## 2018-06-07 NOTE — PROGRESS NOTES
Physical Therapy Visit Note    Name: Samantha Aranda  Clinic Number: [id number]      Diagnosis:   Encounter Diagnoses   Name Primary?    Acute pain of right knee     Difficulty walking      Physician: JACQUELINE Layton MD  Treatment Orders: PT Eval and Treat    No past medical history on file.  Current Outpatient Prescriptions   Medication Sig    aspirin-acetaminophen-caffeine 250-250-65 mg (EXCEDRIN MIGRAINE) 250-250-65 mg per tablet Take 1 tablet by mouth every 6 (six) hours as needed for Pain.    hydrocodone-acetaminophen 5-325mg (NORCO) 5-325 mg per tablet Take 1-2 tablets by mouth every 6 (six) hours as needed for Pain.     No current facility-administered medications for this visit.      Review of patient's allergies indicates:  No Known Allergies  Precautions: MRI confirmed complete ACL tear; undecided on surgery    Today's Date:  6/7/2018  Evaluation Date: 6/4/2018  Visit # authorized: 3/12  Authorization period: 12/31/2018  Time In:  1300  Time Out:  1410    Subjective     Patient states she is feeling so much better after each visit and had a really good day after last visit.    Patient Goals: Return to full functional mobility and ability to exercise as desired without pain or difficulty    Objective     Observation: Patient is ambulating with significant knee flexion on R LE bilateral crutches as recommended by PT.  (too short, adjusted by PT for correct fit)    Posture: Patient stands with bilateral knee valgus, increased Q-angle, femoral internal rotation and forward trunk lean.    Functional tests:    SL Squat: Unable to perform   DL Squat: Unable to perform   Step-down: Unable to perform   SLS EO: Unable to perform    Range of Motion:   Knee Right active Left Active   Flexion 95 141   Extension -2 0     Lower Extremity Strength:  Right LE  Left LE    Quadriceps: trace Quadriceps: 4+/5   Hamstrings: 3/5 Hamstrings: 4/5   Hip flexion (supine): 4/5 Hip flexion (supine): 4/5   Hip extension:  4/5 Hip  extension: 4/5   Posterior Glute Med: 3/5 PGM:  3/5     Special Tests:  None performed.    Joint Mobility: Grossly hypomobile R knee.     Palpation:  Point tenderness noted surrounding patella.    Sensation: Intact.    Flexibility:    Moderately restricted gastroc, quad and hamstrings R LE.    Edema: Mild joint effusion.    Functional Limitations Reports  Category: Mobility  Tool: FOTO Knee 47%    TREATMENT:  Samantha received therapeutic exercises to develop strength and endurance, flexibility for 70 minutes including:   Patellar mobs as tolerated, STM and CFM patellar tendon and surrounding patella  Bike Rocking for flexion stretching  Seated Heel Prop w/ strap 5' and 10#  QS 10sh x 20  SB Bridge 5sh x 20  SB HS Curls w/ Flx OP stretching 30x  GTB TKE w/ toe lift 5sh to fatigue  Ambulation practice (improved gait pattern with only slight extension lag from lack of ROM, no limp)    Not today:  SLR 20x  Bridging 5sh x 20  TKE Ball/Wall 10sh x 30  Standing Calf Raises emph TKE 10x3  Ambulation practice w/ properly adjusted crutches emph TKE     Pt. Received cold pack x 10 min. To right knee following treatment. (not today)    Instructed pt. regarding: Proper technique with all exercises. Pt demonstrated good understanding of the education provided. Samantha demonstrated good return demonstration of activities.    Assessment     Today's Assessment:  Patient reported no pain with today's treatment and improved gait after.  Discussed that around the house she can stop using crutches unless she begins poor gait pattern again; however, on uneven ground and long distances, she needs to continue using AD.    This is a 42 y.o. female referred to outpatient physical therapy and presents with a medical diagnosis of right ACL tear.  Patient presents with signs and symptoms consistent with diagnosis with significant ROM and quad deficits.  Patient's chief complaint is inability to use  R LE.  Patient will benefit from skilled  physical therapy services, specifically normalized ROM, gross lower extremity strengthening, functional mobility, balance/proprioception, gait training and higher-level activities as tolerated.     Medical necessity is demonstrated by the following IMPAIRMENTS/PROBLEM LIST:   1)Increase in pain level limiting function   2)Decreased ROM   3)Decreased strength   4)Decreased ambulation   5)Lack of HEP    GOALS: Short Term Goals:  8 weeks  1.  Report decreased right knee pain  < / =  2/10  to increase tolerance for ADLs and community ambulation.  2.  Increase knee ROM to equal opposite knee in order to be able to perform ADLs without difficulty.  3.  Increase gross lower extremity strength to 4/5 to increase tolerance for ADL and community activities.  4.  Pt to tolerate HEP to improve ROM and independence with ADL's.  5.  Patient will ambulate with normalized gait pattern without the use of AD.    Long Term Goals: 12-16 weeks  1.  Report decreased right knee pain < / = 1/10  to increase tolerance for full return to workout activities as desired.  2.  Patient goal: Return to full ability to workout as desired without pain or difficulty.  3.  Increase strength to >/= 5/5 in bilateral LEs to increase tolerance for ADL and workout activities.  4.  Demonstrate single limb squat with proper biomechanics in order to safely return to daily and recreational activities.  5.  Pt will report at CJ level (20-40% impaired) on FOTO  to demonstrate increase in LE function with every day tasks.     Plan     Pt will be treated by physical therapy 1-2 times a week for Pt Education, HEP, therapeutic exercises, neuromuscular re-education, manual therapy, joint mobilizations, modalities prn to achieve established goals. Samantha may at times be seen by a PTA as part of the Rehab Team.     Cont PT for 12-16 weeks.     Karishma Lopez, PT, DPT, SCS        I certify the need for these services furnished under this plan of treatment and while  under my care.______________________________ Physician/Referring Practitioner  Date of Signature

## 2018-06-11 NOTE — PROGRESS NOTES
CC: Right knee pain    42 y.o. Female who returns for follow up. 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 She has a known ACL tear after jumping off the a truck bed on 5/2/18. Has been in PT for the past 3 weeks.  Reports progress in physical therapy.  No subjective instability events. Previously discussed the details of operative versus nonoperative intervention.     Negative for tobacco.   Negative for diabetes.     This patient is seen in consultation requested by Levi Miller PA-C. We will communicate findings back to the requesting provider via electronic letter.     REVIEW OF SYSTEMS:   Constitution: Negative. Negative for chills, fever and night sweats.    Hematologic/Lymphatic: Negative for bleeding problem. Does not bruise/bleed easily.   Skin: Negative for dry skin, itching and rash.   Musculoskeletal: Negative for falls. Positive for right knee pain and  muscle weakness.     PAST MEDICAL HISTORY:   History reviewed. No pertinent past medical history.    PAST SURGICAL HISTORY:   Past Surgical History:   Procedure Laterality Date    CHOLECYSTECTOMY      TONSILLECTOMY         FAMILY HISTORY:   History reviewed. No pertinent family history.    SOCIAL HISTORY:   Social History     Social History    Marital status:      Spouse name: N/A    Number of children: N/A    Years of education: N/A     Occupational History    Not on file.     Social History Main Topics    Smoking status: Never Smoker    Smokeless tobacco: Never Used    Alcohol use No    Drug use: No    Sexual activity: Not on file     Other Topics Concern    Not on file     Social History Narrative    No narrative on file       MEDICATIONS:     Current Outpatient Prescriptions:     aspirin-acetaminophen-caffeine 250-250-65 mg (EXCEDRIN MIGRAINE) 250-250-65 mg per tablet, Take 1 tablet by mouth every 6 (six) hours as needed for Pain., Disp: , Rfl:     hydrocodone-acetaminophen 5-325mg  "(NORCO) 5-325 mg per tablet, Take 1-2 tablets by mouth every 6 (six) hours as needed for Pain., Disp: 30 tablet, Rfl: 0    ALLERGIES:   Review of patient's allergies indicates:  No Known Allergies     PHYSICAL EXAMINATION:  BP (!) 182/104   Pulse 88   Ht 5' 3" (1.6 m)   Wt 77.6 kg (171 lb)   BMI 30.29 kg/m²   General: Well-developed well-nourished 42 y.o. femalein no acute distress   Cardiovascular: Regular rhythm by palpation of distal pulse, normal color and temperature, no concerning varicosities on symptomatic side   Lungs: No labored breathing or wheezing appreciated   Neuro: Alert and oriented ×3   Psychiatric: well oriented to person, place and time, demonstrates normal mood and affect   Skin: No rashes, lesions or ulcers, normal temperature, turgor, and texture on involved extremity    Ortho/SPM Exam   Examination of the right knee again shows standing physiologic varus alignment.  Trace effusion.  Intact extensor mechanism.  Demonstrates straight leg raise with 5° lag.  Negative patellar apprehension.  1 quadrant medial and lateral patellar mobility.  Central tracking.  Full passive extension.  Passive flexion to 110 degrees with generalized discomfort and guarding. Grade 2B Lachman.  Negative posterior drawer. Negative Chelsy. Stable to varus and valgus stress at 0 and 30°.  Negative prone dial at 30°.  Guards with pivot shift testing.  Mild medial and lateral joint line tenderness.  Nontender specifically over the posterior lateral corner.      IMAGING:  X-rays including standing, weight bearing AP and flexion bilateral knees, RIGHT knee lateral and sunrise views ordered and images reviewed by me show:    No degenerative changes seen    Standing Hip to Ankle views were ordered and reviewed by me. Images show:   Weight-bearing axis crosses over the medial tibial spine    MRI reviewed by me and discussed with patient. Study shows:    1. Complete ACL tear.   2. Grade I-II sprain of the oblique popliteal " ligament/posterior capsule.   3. Grade I sprain of the posterior oblique ligament/posteromedial capsule.   4. Possible grade I sprain of the arcuate and fabelo-fibular ligaments.   5. Linear hyperintensity along the meniscocapsular junction of the posterior horn of the medial meniscus which may relate to contusion or partial separation.  No displaced meniscal tears.   6. Nondisplaced fractures of the posterior aspect of the medial and lateral tibial plateaus.   7. Large joint effusion with associated fat fluid level in keeping with lipohemarthrosis.      ASSESSMENT:      ICD-10-CM ICD-9-CM   1. Rupture of anterior cruciate ligament of right knee, subsequent encounter S83.511D V58.89     844.2       PLAN:     -The patient is making gradual progress in physical therapy.  I discussed the case with her therapist and there is some fear avoidance and poor self efficacy issues.  Plan is for continue nonoperative care for now.  Focus on regaining rotation and optimizing quadriceps/hamstring strength.  Fitted for a ligament brace today.   -RTC in 6 weeks   -All questions answered       Procedures

## 2018-06-12 ENCOUNTER — OFFICE VISIT (OUTPATIENT)
Dept: SPORTS MEDICINE | Facility: CLINIC | Age: 43
End: 2018-06-12
Payer: COMMERCIAL

## 2018-06-12 ENCOUNTER — CLINICAL SUPPORT (OUTPATIENT)
Dept: REHABILITATION | Facility: HOSPITAL | Age: 43
End: 2018-06-12
Attending: ORTHOPAEDIC SURGERY
Payer: COMMERCIAL

## 2018-06-12 VITALS
HEIGHT: 63 IN | HEART RATE: 88 BPM | BODY MASS INDEX: 30.3 KG/M2 | WEIGHT: 171 LBS | SYSTOLIC BLOOD PRESSURE: 182 MMHG | DIASTOLIC BLOOD PRESSURE: 104 MMHG

## 2018-06-12 DIAGNOSIS — R26.2 DIFFICULTY WALKING: ICD-10-CM

## 2018-06-12 DIAGNOSIS — M25.561 ACUTE PAIN OF RIGHT KNEE: ICD-10-CM

## 2018-06-12 DIAGNOSIS — S83.511D RUPTURE OF ANTERIOR CRUCIATE LIGAMENT OF RIGHT KNEE, SUBSEQUENT ENCOUNTER: Primary | ICD-10-CM

## 2018-06-12 PROCEDURE — 99213 OFFICE O/P EST LOW 20 MIN: CPT | Mod: S$GLB,,, | Performed by: ORTHOPAEDIC SURGERY

## 2018-06-12 PROCEDURE — 97110 THERAPEUTIC EXERCISES: CPT

## 2018-06-12 PROCEDURE — 97140 MANUAL THERAPY 1/> REGIONS: CPT

## 2018-06-12 PROCEDURE — 3008F BODY MASS INDEX DOCD: CPT | Mod: CPTII,S$GLB,, | Performed by: ORTHOPAEDIC SURGERY

## 2018-06-12 PROCEDURE — 99999 PR PBB SHADOW E&M-EST. PATIENT-LVL III: CPT | Mod: PBBFAC,,, | Performed by: ORTHOPAEDIC SURGERY

## 2018-06-12 NOTE — PROGRESS NOTES
Physical Therapy Visit Note    Name: Samantha Aranda  Clinic Number: [id number]      Diagnosis:   Encounter Diagnoses   Name Primary?    Acute pain of right knee     Difficulty walking      Physician: JACQUELINE Layton MD  Treatment Orders: PT Eval and Treat    No past medical history on file.  Current Outpatient Prescriptions   Medication Sig    aspirin-acetaminophen-caffeine 250-250-65 mg (EXCEDRIN MIGRAINE) 250-250-65 mg per tablet Take 1 tablet by mouth every 6 (six) hours as needed for Pain.    hydrocodone-acetaminophen 5-325mg (NORCO) 5-325 mg per tablet Take 1-2 tablets by mouth every 6 (six) hours as needed for Pain.     No current facility-administered medications for this visit.      Review of patient's allergies indicates:  No Known Allergies  Precautions: MRI confirmed complete ACL tear; undecided on surgery    Today's Date:  6/12/2018  Evaluation Date: 6/4/2018  Visit # authorized: 4/12  Authorization period: 12/31/2018  Time In:  1000  Time Out:  1100    Subjective     Patient states she is pretty sore and has been on her feet a lot more.  She states no matter how much she stretches it just gets sore/stiff again.    Patient Goals: Return to full functional mobility and ability to exercise as desired without pain or difficulty    Objective     Observation: Patient is ambulating with decreased TKE, mildly antalgic gait pattern.    Posture: Patient stands with bilateral knee valgus, increased Q-angle, femoral internal rotation and forward trunk lean.    Functional tests:    SL Squat: Unable to perform   DL Squat: Unable to perform   Step-down: Unable to perform   SLS EO: Unable to perform    Range of Motion:   Knee Right active Left Active   Flexion 110 141   Extension -4 pre-tx 0     Lower Extremity Strength:  Right LE  Left LE    Quadriceps: trace Quadriceps: 4+/5   Hamstrings: 3/5 Hamstrings: 4/5   Hip flexion (supine): 4/5 Hip flexion (supine): 4/5   Hip extension:  4/5 Hip extension: 4/5   Posterior  Glute Med: 3/5 PGM:  3/5     Functional Limitations Reports  Category: Mobility  Tool: FOTO Knee 47%    TREATMENT:  Samantha received therapeutic exercises to develop strength and endurance, flexibility for 30 minutes including:   Bike Rocking for flexion stretching  Seated Heel Prop w/ strap 5' and 10#  QS 10sh x 20  SB Bridge 5sh x 20  Bridging 5sh x 20  GTB TKE 10x3    Manual Therapy for soft tissue mobilization, joint mobilization and decreased pain for 20 minutes including:  Aggressive patellar mobs as tolerated, STM and CFM patellar tendon and surrounding patella  Ext stretching and posterior femoral mobs as tolerated    Not today:  SLR 20x  Bridging 5sh x 20  TKE Ball/Wall 10sh x 30  Standing Calf Raises emph TKE 10x3  Ambulation practice w/ properly adjusted crutches emph TKE     Pt. Received cold pack x 10 min. To right knee following treatment. (not today)    Instructed pt. regarding: Proper technique with all exercises. Pt demonstrated good understanding of the education provided. Samantha demonstrated good return demonstration of activities.    Assessment     Today's Assessment:  Discussed again with patient the importance of using crutches as needed with prolonged ambulation to decrease stress on joint as well as improve extension in stance.  Discussed continued non-op treatment with MD.    This is a 42 y.o. female referred to outpatient physical therapy and presents with a medical diagnosis of right ACL tear.  Patient presents with signs and symptoms consistent with diagnosis with significant ROM and quad deficits.  Patient's chief complaint is inability to use  R LE.  Patient will benefit from skilled physical therapy services, specifically normalized ROM, gross lower extremity strengthening, functional mobility, balance/proprioception, gait training and higher-level activities as tolerated.     Medical necessity is demonstrated by the following IMPAIRMENTS/PROBLEM LIST:   1)Increase in pain level  limiting function   2)Decreased ROM   3)Decreased strength   4)Decreased ambulation   5)Lack of HEP    GOALS: Short Term Goals:  8 weeks  1.  Report decreased right knee pain  < / =  2/10  to increase tolerance for ADLs and community ambulation.  2.  Increase knee ROM to equal opposite knee in order to be able to perform ADLs without difficulty.  3.  Increase gross lower extremity strength to 4/5 to increase tolerance for ADL and community activities.  4.  Pt to tolerate HEP to improve ROM and independence with ADL's.  5.  Patient will ambulate with normalized gait pattern without the use of AD.    Long Term Goals: 12-16 weeks  1.  Report decreased right knee pain < / = 1/10  to increase tolerance for full return to workout activities as desired.  2.  Patient goal: Return to full ability to workout as desired without pain or difficulty.  3.  Increase strength to >/= 5/5 in bilateral LEs to increase tolerance for ADL and workout activities.  4.  Demonstrate single limb squat with proper biomechanics in order to safely return to daily and recreational activities.  5.  Pt will report at CJ level (20-40% impaired) on FOTO  to demonstrate increase in LE function with every day tasks.     Plan     Pt will be treated by physical therapy 1-2 times a week for Pt Education, HEP, therapeutic exercises, neuromuscular re-education, manual therapy, joint mobilizations, modalities prn to achieve established goals. Samantha may at times be seen by a PTA as part of the Rehab Team.     Cont PT for 12-16 weeks.     Karishma Lopez, PT, DPT, SCS        I certify the need for these services furnished under this plan of treatment and while under my care.______________________________ Physician/Referring Practitioner  Date of Signature

## 2018-06-15 ENCOUNTER — CLINICAL SUPPORT (OUTPATIENT)
Dept: REHABILITATION | Facility: HOSPITAL | Age: 43
End: 2018-06-15
Attending: ORTHOPAEDIC SURGERY
Payer: COMMERCIAL

## 2018-06-15 DIAGNOSIS — M25.561 ACUTE PAIN OF RIGHT KNEE: ICD-10-CM

## 2018-06-15 DIAGNOSIS — R26.2 DIFFICULTY WALKING: ICD-10-CM

## 2018-06-15 PROCEDURE — 97110 THERAPEUTIC EXERCISES: CPT

## 2018-06-15 PROCEDURE — 97140 MANUAL THERAPY 1/> REGIONS: CPT

## 2018-06-15 NOTE — PROGRESS NOTES
Physical Therapy Visit Note    Name: Samantha Aranda  Clinic Number: [id number]      Diagnosis:   Encounter Diagnoses   Name Primary?    Acute pain of right knee     Difficulty walking      Physician: JACQUELINE Layton MD  Treatment Orders: PT Eval and Treat    No past medical history on file.  Current Outpatient Prescriptions   Medication Sig    aspirin-acetaminophen-caffeine 250-250-65 mg (EXCEDRIN MIGRAINE) 250-250-65 mg per tablet Take 1 tablet by mouth every 6 (six) hours as needed for Pain.    hydrocodone-acetaminophen 5-325mg (NORCO) 5-325 mg per tablet Take 1-2 tablets by mouth every 6 (six) hours as needed for Pain.     No current facility-administered medications for this visit.      Review of patient's allergies indicates:  No Known Allergies  Precautions: MRI confirmed complete ACL tear; undecided on surgery    Today's Date:  6/15/2018  Evaluation Date: 6/4/2018  Visit # authorized: 5/12  Authorization period: 12/31/2018  Time In:  1015  Time Out:  1105    Subjective     Patient states she did the elliptical for about 20 minutes yesterday.  She has not been focusing on her stretching as much.    Patient Goals: Return to full functional mobility and ability to exercise as desired without pain or difficulty    Objective     Observation: Patient is ambulating with decreased TKE, mildly antalgic gait pattern.    Posture: Patient stands with bilateral knee valgus, increased Q-angle, femoral internal rotation and forward trunk lean.    Functional tests:    SL Squat: Unable to perform   DL Squat: Unable to perform   Step-down: Unable to perform   SLS EO: Unable to perform    Range of Motion:   Knee Right active Left Active   Flexion 110 141   Extension -4 pre-tx 0     Lower Extremity Strength:  Right LE  Left LE    Quadriceps: trace Quadriceps: 4+/5   Hamstrings: 3/5 Hamstrings: 4/5   Hip flexion (supine): 4/5 Hip flexion (supine): 4/5   Hip extension:  4/5 Hip extension: 4/5   Posterior Glute Med: 3/5 PGM:   3/5     Functional Limitations Reports  Category: Mobility  Tool: FOTO Knee 47%    TREATMENT:  Samantha received therapeutic exercises to develop strength and endurance, flexibility for 40 minutes including:   Bike Rocking for flexion stretching  Seated Heel Prop w/ strap 5' and 10#  QS 10sh x 20  SB Bridge 5sh x 20  RDL 10x3 emph TKE  Slow March 3' emph TKE in stance and knee flx in swing  Tandem Stance 1'B  Walking Forward Fast/Backward 5 laps (patient required cuing to not look at ground/feet and to walk quickly and stop guarding and hesitating so much)    Manual Therapy for soft tissue mobilization, joint mobilization and decreased pain for 10 minutes including:  Patellar mobs as tolerated, STM and CFM patellar tendon and surrounding patella  Ext stretching and posterior femoral mobs as tolerated    Not today:  SLR 20x  Bridging 5sh x 20  TKE Ball/Wall 10sh x 30  Standing Calf Raises emph TKE 10x3  Ambulation practice w/ properly adjusted crutches emph TKE     Pt. Received cold pack x 10 min. To right knee following treatment. (not today)    Instructed pt. regarding: Proper technique with all exercises. Pt demonstrated good understanding of the education provided. Samantha demonstrated good return demonstration of activities.    Assessment     Today's Assessment:  Patient was educated on the importance of doing proper HEP for ROM and strengthening within that range, especially extension since she is not maintaining extension between visits.  She was also educated to hold off of elliptical at this time 2/2 continued weakness.  She was very fatigued with today's treatment.  Ambulation was much more normalized after today's visit.    This is a 42 y.o. female referred to outpatient physical therapy and presents with a medical diagnosis of right ACL tear.  Patient presents with signs and symptoms consistent with diagnosis with significant ROM and quad deficits.  Patient's chief complaint is inability to use  R LE.   Patient will benefit from skilled physical therapy services, specifically normalized ROM, gross lower extremity strengthening, functional mobility, balance/proprioception, gait training and higher-level activities as tolerated.     Medical necessity is demonstrated by the following IMPAIRMENTS/PROBLEM LIST:   1)Increase in pain level limiting function   2)Decreased ROM   3)Decreased strength   4)Decreased ambulation   5)Lack of HEP    GOALS: Short Term Goals:  8 weeks  1.  Report decreased right knee pain  < / =  2/10  to increase tolerance for ADLs and community ambulation.  2.  Increase knee ROM to equal opposite knee in order to be able to perform ADLs without difficulty.  3.  Increase gross lower extremity strength to 4/5 to increase tolerance for ADL and community activities.  4.  Pt to tolerate HEP to improve ROM and independence with ADL's.  5.  Patient will ambulate with normalized gait pattern without the use of AD.    Long Term Goals: 12-16 weeks  1.  Report decreased right knee pain < / = 1/10  to increase tolerance for full return to workout activities as desired.  2.  Patient goal: Return to full ability to workout as desired without pain or difficulty.  3.  Increase strength to >/= 5/5 in bilateral LEs to increase tolerance for ADL and workout activities.  4.  Demonstrate single limb squat with proper biomechanics in order to safely return to daily and recreational activities.  5.  Pt will report at CJ level (20-40% impaired) on FOTO  to demonstrate increase in LE function with every day tasks.     Plan     Pt will be treated by physical therapy 1-2 times a week for Pt Education, HEP, therapeutic exercises, neuromuscular re-education, manual therapy, joint mobilizations, modalities prn to achieve established goals. Samantha may at times be seen by a PTA as part of the Rehab Team.     Cont PT for 12-16 weeks.     Karishma Lopez, PT, DPT, SCS        I certify the need for these services furnished under  this plan of treatment and while under my care.______________________________ Physician/Referring Practitioner  Date of Signature

## 2018-06-18 ENCOUNTER — CLINICAL SUPPORT (OUTPATIENT)
Dept: REHABILITATION | Facility: HOSPITAL | Age: 43
End: 2018-06-18
Attending: ORTHOPAEDIC SURGERY
Payer: COMMERCIAL

## 2018-06-18 DIAGNOSIS — M25.561 ACUTE PAIN OF RIGHT KNEE: ICD-10-CM

## 2018-06-18 DIAGNOSIS — R26.2 DIFFICULTY WALKING: ICD-10-CM

## 2018-06-18 PROCEDURE — 97110 THERAPEUTIC EXERCISES: CPT

## 2018-06-19 NOTE — PROGRESS NOTES
Physical Therapy Visit Note    Name: Samantha Aranda  Clinic Number: [id number]      Diagnosis:   Encounter Diagnoses   Name Primary?    Acute pain of right knee     Difficulty walking      Physician: JACQUELINE Layton MD  Treatment Orders: PT Eval and Treat    No past medical history on file.  Current Outpatient Prescriptions   Medication Sig    aspirin-acetaminophen-caffeine 250-250-65 mg (EXCEDRIN MIGRAINE) 250-250-65 mg per tablet Take 1 tablet by mouth every 6 (six) hours as needed for Pain.    hydrocodone-acetaminophen 5-325mg (NORCO) 5-325 mg per tablet Take 1-2 tablets by mouth every 6 (six) hours as needed for Pain.     No current facility-administered medications for this visit.      Review of patient's allergies indicates:  No Known Allergies  Precautions: MRI confirmed complete ACL tear; undecided on surgery    Today's Date:  6/18/2018  Evaluation Date: 6/4/2018  Visit # authorized: 6/12  Authorization period: 12/31/2018  Time In:  1100  Time Out:  1200    Subjective     Patient reports she has not been doing very many exercises but has been stretching a lot.    Patient Goals: Return to full functional mobility and ability to exercise as desired without pain or difficulty    Objective     Observation: Patient is ambulating with decreased TKE, mildly antalgic gait pattern.    Posture: Patient stands with bilateral knee valgus, increased Q-angle, femoral internal rotation and forward trunk lean.    Functional tests:    SL Squat: Unable to perform   DL Squat: Unable to perform   Step-down: Unable to perform   SLS EO: Unable to perform    Range of Motion:   Knee Right active Left Active   Flexion 110 141   Extension -4 pre-tx 0     Lower Extremity Strength:  Right LE  Left LE    Quadriceps: trace Quadriceps: 4+/5   Hamstrings: 3/5 Hamstrings: 4/5   Hip flexion (supine): 4/5 Hip flexion (supine): 4/5   Hip extension:  4/5 Hip extension: 4/5   Posterior Glute Med: 3/5 PGM:  3/5     Functional Limitations  Reports  Category: Mobility  Tool: FOTO Knee 47%    TREATMENT:  Samantha received therapeutic exercises to develop strength and endurance, flexibility for 50 minutes including:   Bike Rocking for flexion stretching  QS 10sh x 30  QS w/ bolster 10sh x 30  SLR 30x emph TKE  SB Bridge 5sh x 20  RDL 10x3 emph TKE  Mini Squats 30x emph TKE and glute control of valgus  Slow March 3' emph TKE in stance and knee flx in swing  Ambulation practice    Manual Therapy for soft tissue mobilization, joint mobilization and decreased pain for 0 minutes including:  Patellar mobs as tolerated, STM and CFM patellar tendon and surrounding patella  Ext stretching and posterior femoral mobs as tolerated    Not today:  SLR 20x  Bridging 5sh x 20  TKE Ball/Wall 10sh x 30  Standing Calf Raises emph TKE 10x3  Ambulation practice w/ properly adjusted crutches emph TKE     Pt. Received cold pack x 10 min. To right knee following treatment. (not today)    Instructed pt. regarding: Proper technique with all exercises. Pt demonstrated good understanding of the education provided. Samantha demonstrated good return demonstration of activities.    Assessment     Today's Assessment:  Patient was extensively educated that she needs to strengthen muscles with exercises provided even though she thinks they are boring because she is still not ambulating with TKE although range of motion has improved.  Patient was very fatigued with today's treatment and reports she will resume strengthening at home as instructed.  Ambulation pattern still not maintained between visits and patient was instructed to use crutch if she is not strong enough to keep is as straight as when she leaves PT appointments.    This is a 42 y.o. female referred to outpatient physical therapy and presents with a medical diagnosis of right ACL tear.  Patient presents with signs and symptoms consistent with diagnosis with significant ROM and quad deficits.  Patient's chief complaint is  inability to use  R LE.  Patient will benefit from skilled physical therapy services, specifically normalized ROM, gross lower extremity strengthening, functional mobility, balance/proprioception, gait training and higher-level activities as tolerated.     Medical necessity is demonstrated by the following IMPAIRMENTS/PROBLEM LIST:   1)Increase in pain level limiting function   2)Decreased ROM   3)Decreased strength   4)Decreased ambulation   5)Lack of HEP    GOALS: Short Term Goals:  8 weeks  1.  Report decreased right knee pain  < / =  2/10  to increase tolerance for ADLs and community ambulation.  2.  Increase knee ROM to equal opposite knee in order to be able to perform ADLs without difficulty.  3.  Increase gross lower extremity strength to 4/5 to increase tolerance for ADL and community activities.  4.  Pt to tolerate HEP to improve ROM and independence with ADL's.  5.  Patient will ambulate with normalized gait pattern without the use of AD.    Long Term Goals: 12-16 weeks  1.  Report decreased right knee pain < / = 1/10  to increase tolerance for full return to workout activities as desired.  2.  Patient goal: Return to full ability to workout as desired without pain or difficulty.  3.  Increase strength to >/= 5/5 in bilateral LEs to increase tolerance for ADL and workout activities.  4.  Demonstrate single limb squat with proper biomechanics in order to safely return to daily and recreational activities.  5.  Pt will report at CJ level (20-40% impaired) on FOTO  to demonstrate increase in LE function with every day tasks.     Plan     Pt will be treated by physical therapy 1-2 times a week for Pt Education, HEP, therapeutic exercises, neuromuscular re-education, manual therapy, joint mobilizations, modalities prn to achieve established goals. Samantha may at times be seen by a PTA as part of the Rehab Team.     Cont PT for 12-16 weeks.     Karishma Lopez, PT, DPT, SCS        I certify the need for these  services furnished under this plan of treatment and while under my care.______________________________ Physician/Referring Practitioner  Date of Signature

## 2018-06-22 ENCOUNTER — CLINICAL SUPPORT (OUTPATIENT)
Dept: REHABILITATION | Facility: HOSPITAL | Age: 43
End: 2018-06-22
Attending: ORTHOPAEDIC SURGERY
Payer: COMMERCIAL

## 2018-06-22 DIAGNOSIS — R26.2 DIFFICULTY WALKING: ICD-10-CM

## 2018-06-22 DIAGNOSIS — M25.561 ACUTE PAIN OF RIGHT KNEE: Primary | ICD-10-CM

## 2018-06-22 PROCEDURE — 97110 THERAPEUTIC EXERCISES: CPT

## 2018-06-22 NOTE — PROGRESS NOTES
Physical Therapy Visit Note    Name: Samantha Aranda  Clinic Number: [id number]      Diagnosis:   No diagnosis found.  Physician: JACQUELINE Layton MD  Treatment Orders: PT Eval and Treat    No past medical history on file.  Current Outpatient Prescriptions   Medication Sig    aspirin-acetaminophen-caffeine 250-250-65 mg (EXCEDRIN MIGRAINE) 250-250-65 mg per tablet Take 1 tablet by mouth every 6 (six) hours as needed for Pain.    hydrocodone-acetaminophen 5-325mg (NORCO) 5-325 mg per tablet Take 1-2 tablets by mouth every 6 (six) hours as needed for Pain.     No current facility-administered medications for this visit.      Review of patient's allergies indicates:  No Known Allergies  Precautions: MRI confirmed complete ACL tear; undecided on surgery    Today's Date:  6/18/2018  Evaluation Date: 6/4/2018  Visit # authorized: 7/12  Authorization period: 12/31/2018  Time In:  11:10  Time Out:  12:00    Subjective     Pt states feeling okay w/ mild discomfort in R knee.  Pt mentioned being more compliant with HEP since last visit.      Patient Goals: Return to full functional mobility and ability to exercise as desired without pain or difficulty    Objective     Observation: Patient is ambulating with decreased TKE, mildly antalgic gait pattern.    Functional Limitations Reports  Category: Mobility  Tool: FOTO Knee 47%    TREATMENT:  Samantha received therapeutic exercises to develop strength and endurance, flexibility for 25 minutes including:   Pt arrived 10 min late to tx today.   Bike Rocking for flexion stretching 6 min   TM reverse walking 5 min 1 mph  Cone hesitation walk 5 cone 5 laps w/ UE assist   QS 5sh x 30 over pressure    QS w/ bolster 10sh x 30  SLR 30x emph TKE  SB Bridge 5sh x 30  Mini Squats 30x emph TKE and glute control of valgus      Manual Therapy for soft tissue mobilization, joint mobilization and decreased pain for 0 minutes including:  Patellar mobs as tolerated, STM and CFM patellar tendon and  surrounding patella  Ext stretching and posterior femoral mobs as tolerated    Not today:  Slow March 3' emph TKE in stance and knee flx in swing  Ambulation practice  RDL 10x3 emph TKE  SLR 20x  Bridging 5sh x 20  TKE Ball/Wall 10sh x 30  Standing Calf Raises emph TKE 10x3  Ambulation practice w/ properly adjusted crutches emph TKE     Pt. Received cold pack x 10 min. To right knee following treatment.     Instructed pt. regarding: Proper technique with all exercises. Pt demonstrated good understanding of the education provided. Samantha demonstrated good return demonstration of activities.    Assessment     Pt kanu tx well.  Pn and swelling decreased after tx session.  Pt demonstrated improved gait pattern and muscular endurance during therex w/ VCs for technique.  Pt cont to lack some ROM and strength.  Pt edu on proper exercise technique through VCs.  Pt instructed to cont HEP in full.  Cont to progress as kanu.      This is a 42 y.o. female referred to outpatient physical therapy and presents with a medical diagnosis of right ACL tear.  Patient presents with signs and symptoms consistent with diagnosis with significant ROM and quad deficits.  Patient's chief complaint is inability to use  R LE.  Patient will benefit from skilled physical therapy services, specifically normalized ROM, gross lower extremity strengthening, functional mobility, balance/proprioception, gait training and higher-level activities as tolerated.     Medical necessity is demonstrated by the following IMPAIRMENTS/PROBLEM LIST:   1)Increase in pain level limiting function   2)Decreased ROM   3)Decreased strength   4)Decreased ambulation   5)Lack of HEP    GOALS: Short Term Goals:  8 weeks  1.  Report decreased right knee pain  < / =  2/10  to increase tolerance for ADLs and community ambulation.  2.  Increase knee ROM to equal opposite knee in order to be able to perform ADLs without difficulty.  3.  Increase gross lower extremity strength to  4/5 to increase tolerance for ADL and community activities.  4.  Pt to tolerate HEP to improve ROM and independence with ADL's.  5.  Patient will ambulate with normalized gait pattern without the use of AD.    Long Term Goals: 12-16 weeks  1.  Report decreased right knee pain < / = 1/10  to increase tolerance for full return to workout activities as desired.  2.  Patient goal: Return to full ability to workout as desired without pain or difficulty.  3.  Increase strength to >/= 5/5 in bilateral LEs to increase tolerance for ADL and workout activities.  4.  Demonstrate single limb squat with proper biomechanics in order to safely return to daily and recreational activities.  5.  Pt will report at CJ level (20-40% impaired) on FOTO  to demonstrate increase in LE function with every day tasks.     Plan   Cont to progress towards goals set by PT.  Cont to focus on full knee extension next visit.      Pt will be treated by physical therapy 1-2 times a week for Pt Education, HEP, therapeutic exercises, neuromuscular re-education, manual therapy, joint mobilizations, modalities prn to achieve established goals. Samantha may at times be seen by a PTA as part of the Rehab Team.     Cont PT for 12-16 weeks.     Israel Iniguez PTA

## 2018-06-25 ENCOUNTER — CLINICAL SUPPORT (OUTPATIENT)
Dept: REHABILITATION | Facility: HOSPITAL | Age: 43
End: 2018-06-25
Attending: ORTHOPAEDIC SURGERY
Payer: COMMERCIAL

## 2018-06-25 DIAGNOSIS — M25.561 ACUTE PAIN OF RIGHT KNEE: ICD-10-CM

## 2018-06-25 DIAGNOSIS — R26.2 DIFFICULTY WALKING: ICD-10-CM

## 2018-06-25 PROCEDURE — 97110 THERAPEUTIC EXERCISES: CPT

## 2018-06-25 NOTE — PROGRESS NOTES
"Physical Therapy Visit Note    Name: Samantha Aranda  Clinic Number: [id number]      Diagnosis:   Encounter Diagnoses   Name Primary?    Acute pain of right knee     Difficulty walking      Physician: JACQUELINE Layton MD  Treatment Orders: PT Eval and Treat    No past medical history on file.  Current Outpatient Prescriptions   Medication Sig    aspirin-acetaminophen-caffeine 250-250-65 mg (EXCEDRIN MIGRAINE) 250-250-65 mg per tablet Take 1 tablet by mouth every 6 (six) hours as needed for Pain.    hydrocodone-acetaminophen 5-325mg (NORCO) 5-325 mg per tablet Take 1-2 tablets by mouth every 6 (six) hours as needed for Pain.     No current facility-administered medications for this visit.      Review of patient's allergies indicates:  No Known Allergies  Precautions: MRI confirmed complete ACL tear; undecided on surgery    Today's Date:  6/25/2018  Evaluation Date: 6/4/2018  Visit # authorized: 8/12  Authorization period: 12/31/2018  Time In:  1015  Time Out:  1055    Subjective     Patient states she was very busy over the weekend and her knee swelled a lot but not too painful.  She does feel like she is doing better overall.    Patient Goals: Return to full functional mobility and ability to exercise as desired without pain or difficulty    Objective     Observation: Patient is ambulating with decreased TKE, mildly antalgic gait pattern.    Functional Limitations Reports  Category: Mobility  Tool: FOTO Knee 47%    TREATMENT:  Samantha received therapeutic exercises to develop strength and endurance, flexibility for 40 minutes including:   Pt arrived 15 min late to tx today.   Elliptical L1 4'/4' for motor control and strengthening  Standing Gastroc Stretch on Wedge 2'  Shuttle Squats DL 50# 10x3 w/ TKE toe lift   Shuttle Squats SL 25# 10x3 w/ TKE toe lift  12kg Deadlift 10x3  SLS on Foam 30"x2B  SLS EC to fatigue B    Manual Therapy for soft tissue mobilization, joint mobilization and decreased pain for 0 minutes " including:  Patellar mobs as tolerated, STM and CFM patellar tendon and surrounding patella  Ext stretching and posterior femoral mobs as tolerated    Not today:  TM reverse walking 5 min 1 mph  Cone hesitation walk 5 cone 5 laps w/ UE assist   QS 5sh x 30 over pressure    QS w/ bolster 10sh x 30  SLR 30x emph TKE  SB Bridge 5sh x 30  Mini Squats 30x emph TKE and glute control of valgus  Slow March 3' emph TKE in stance and knee flx in swing  Ambulation practice  RDL 10x3 emph TKE  SLR 20x  Bridging 5sh x 20  TKE Ball/Wall 10sh x 30  Standing Calf Raises emph TKE 10x3  Ambulation practice w/ properly adjusted crutches emph TKE     Pt. Received cold pack x 10 min. To right knee following treatment.     Instructed pt. regarding: Proper technique with all exercises. Pt demonstrated good understanding of the education provided. Samantha demonstrated good return demonstration of activities.    Assessment     Today's Assessment:  Patient tolerated today's treatment well and is progressing well.  Discussed HEP and added squats and balance.  Patient enjoyed today's exercises.    This is a 42 y.o. female referred to outpatient physical therapy and presents with a medical diagnosis of right ACL tear.  Patient presents with signs and symptoms consistent with diagnosis with significant ROM and quad deficits.  Patient's chief complaint is inability to use  R LE.  Patient will benefit from skilled physical therapy services, specifically normalized ROM, gross lower extremity strengthening, functional mobility, balance/proprioception, gait training and higher-level activities as tolerated.     Medical necessity is demonstrated by the following IMPAIRMENTS/PROBLEM LIST:   1)Increase in pain level limiting function   2)Decreased ROM   3)Decreased strength   4)Decreased ambulation   5)Lack of HEP    GOALS: Short Term Goals:  8 weeks  1.  Report decreased right knee pain  < / =  2/10  to increase tolerance for ADLs and community  ambulation.  2.  Increase knee ROM to equal opposite knee in order to be able to perform ADLs without difficulty.  3.  Increase gross lower extremity strength to 4/5 to increase tolerance for ADL and community activities.  4.  Pt to tolerate HEP to improve ROM and independence with ADL's.  5.  Patient will ambulate with normalized gait pattern without the use of AD.    Long Term Goals: 12-16 weeks  1.  Report decreased right knee pain < / = 1/10  to increase tolerance for full return to workout activities as desired.  2.  Patient goal: Return to full ability to workout as desired without pain or difficulty.  3.  Increase strength to >/= 5/5 in bilateral LEs to increase tolerance for ADL and workout activities.  4.  Demonstrate single limb squat with proper biomechanics in order to safely return to daily and recreational activities.  5.  Pt will report at CJ level (20-40% impaired) on FOTO  to demonstrate increase in LE function with every day tasks.     Plan   Cont to progress towards goals set by PT.  Cont to focus on full knee extension next visit.      Pt will be treated by physical therapy 1-2 times a week for Pt Education, HEP, therapeutic exercises, neuromuscular re-education, manual therapy, joint mobilizations, modalities prn to achieve established goals. Samantha may at times be seen by a PTA as part of the Rehab Team.     Cont PT for 12-16 weeks.     Karishma Lopez, PT, DPT, SCS

## 2018-06-29 ENCOUNTER — CLINICAL SUPPORT (OUTPATIENT)
Dept: REHABILITATION | Facility: HOSPITAL | Age: 43
End: 2018-06-29
Attending: ORTHOPAEDIC SURGERY
Payer: COMMERCIAL

## 2018-06-29 DIAGNOSIS — M25.561 ACUTE PAIN OF RIGHT KNEE: ICD-10-CM

## 2018-06-29 DIAGNOSIS — R26.2 DIFFICULTY WALKING: ICD-10-CM

## 2018-06-29 PROCEDURE — 97110 THERAPEUTIC EXERCISES: CPT

## 2018-06-29 NOTE — PROGRESS NOTES
"Physical Therapy Visit Note    Name: Samantha Aranda  Clinic Number: [id number]      Diagnosis:   Encounter Diagnoses   Name Primary?    Acute pain of right knee     Difficulty walking      Physician: JACQUELINE Layton MD  Treatment Orders: PT Eval and Treat    No past medical history on file.  Current Outpatient Prescriptions   Medication Sig    aspirin-acetaminophen-caffeine 250-250-65 mg (EXCEDRIN MIGRAINE) 250-250-65 mg per tablet Take 1 tablet by mouth every 6 (six) hours as needed for Pain.    hydrocodone-acetaminophen 5-325mg (NORCO) 5-325 mg per tablet Take 1-2 tablets by mouth every 6 (six) hours as needed for Pain.     No current facility-administered medications for this visit.      Review of patient's allergies indicates:  No Known Allergies  Precautions: MRI confirmed complete ACL tear; undecided on surgery    Today's Date:  6/29/2018  Evaluation Date: 6/4/2018  Visit # authorized: 9/12  Authorization period: 12/31/2018  Time In:  1005  Time Out:  1100    Subjective     Patient states she has still been feeling pretty good and much stronger.  She states that she still feels pain below knee cap when walking and squatting down.    Patient Goals: Return to full functional mobility and ability to exercise as desired without pain or difficulty    Objective     Observation: Patient is ambulating with decreased TKE, mildly antalgic gait pattern.    Functional Limitations Reports  Category: Mobility  Tool: FOTO Knee 47%    TREATMENT:  Samantha received therapeutic exercises to develop strength and endurance, flexibility for 55 minutes including:   Elliptical L5 4'/4' for motor control and strengthening  Shuttle Squats DL 75# 10x3 w/ TKE toe lift   Shuttle Squats SL 62.5# 10x3 w/ TKE toe lift  Shuttle Calf Raises 62.5# 10x3  12kg Deadlift 10x3  GTB Monster Walks Fwd/Bkwd 1 lap  Wall Sits 30"x3  SLS 3-way Ball Toss 20x ea.  MATHIEU Lungjustina 10x3B    Manual Therapy for soft tissue mobilization, joint mobilization and " decreased pain for 0 minutes including:  Patellar mobs as tolerated, STM and CFM patellar tendon and surrounding patella  Ext stretching and posterior femoral mobs as tolerated    Not today:  TM reverse walking 5 min 1 mph  Cone hesitation walk 5 cone 5 laps w/ UE assist   QS 5sh x 30 over pressure    QS w/ bolster 10sh x 30  SLR 30x emph TKE  SB Bridge 5sh x 30  Mini Squats 30x emph TKE and glute control of valgus  Slow March 3' emph TKE in stance and knee flx in swing  Ambulation practice  RDL 10x3 emph TKE  SLR 20x  Bridging 5sh x 20  TKE Ball/Wall 10sh x 30  Standing Calf Raises emph TKE 10x3  Ambulation practice w/ properly adjusted crutches emph TKE     Pt. Received cold pack x 10 min. To right knee following treatment.     Instructed pt. regarding: Proper technique with all exercises. Pt demonstrated good understanding of the education provided. Samantha demonstrated good return demonstration of activities.    Assessment     Today's Assessment:  Patient is progressing very well and enjoyed today's exercises.  Continue adding strength, functional mobility and balance/proprioception.    This is a 42 y.o. female referred to outpatient physical therapy and presents with a medical diagnosis of right ACL tear.  Patient presents with signs and symptoms consistent with diagnosis with significant ROM and quad deficits.  Patient's chief complaint is inability to use  R LE.  Patient will benefit from skilled physical therapy services, specifically normalized ROM, gross lower extremity strengthening, functional mobility, balance/proprioception, gait training and higher-level activities as tolerated.     Medical necessity is demonstrated by the following IMPAIRMENTS/PROBLEM LIST:   1)Increase in pain level limiting function   2)Decreased ROM   3)Decreased strength   4)Decreased ambulation   5)Lack of HEP    GOALS: Short Term Goals:  8 weeks  1.  Report decreased right knee pain  < / =  2/10  to increase tolerance for ADLs  and community ambulation.  2.  Increase knee ROM to equal opposite knee in order to be able to perform ADLs without difficulty.  3.  Increase gross lower extremity strength to 4/5 to increase tolerance for ADL and community activities.  4.  Pt to tolerate HEP to improve ROM and independence with ADL's.  5.  Patient will ambulate with normalized gait pattern without the use of AD.    Long Term Goals: 12-16 weeks  1.  Report decreased right knee pain < / = 1/10  to increase tolerance for full return to workout activities as desired.  2.  Patient goal: Return to full ability to workout as desired without pain or difficulty.  3.  Increase strength to >/= 5/5 in bilateral LEs to increase tolerance for ADL and workout activities.  4.  Demonstrate single limb squat with proper biomechanics in order to safely return to daily and recreational activities.  5.  Pt will report at CJ level (20-40% impaired) on FOTO  to demonstrate increase in LE function with every day tasks.     Plan   Cont to progress towards goals set by PT.  Cont to focus on full knee extension next visit.      Pt will be treated by physical therapy 1-2 times a week for Pt Education, HEP, therapeutic exercises, neuromuscular re-education, manual therapy, joint mobilizations, modalities prn to achieve established goals. Samantha may at times be seen by a PTA as part of the Rehab Team.     Cont PT for 12-16 weeks.     Karishma Lopez, PT, DPT, SCS